# Patient Record
Sex: MALE | Race: WHITE | NOT HISPANIC OR LATINO | URBAN - METROPOLITAN AREA
[De-identification: names, ages, dates, MRNs, and addresses within clinical notes are randomized per-mention and may not be internally consistent; named-entity substitution may affect disease eponyms.]

---
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
## 2024-05-27 ENCOUNTER — HOSPITAL ENCOUNTER (EMERGENCY)
Facility: HOSPITAL | Age: 19
Discharge: HOME | End: 2024-05-27
Attending: FAMILY MEDICINE
Payer: MEDICAID

## 2024-05-27 VITALS
OXYGEN SATURATION: 97 % | WEIGHT: 235 LBS | SYSTOLIC BLOOD PRESSURE: 118 MMHG | DIASTOLIC BLOOD PRESSURE: 68 MMHG | HEART RATE: 95 BPM | BODY MASS INDEX: 37.77 KG/M2 | HEIGHT: 66 IN | TEMPERATURE: 101 F | RESPIRATION RATE: 20 BRPM

## 2024-05-27 DIAGNOSIS — L02.416 ABSCESS OF LEFT LEG: Primary | ICD-10-CM

## 2024-05-27 PROCEDURE — 10060 I&D ABSCESS SIMPLE/SINGLE: CPT

## 2024-05-27 PROCEDURE — 87070 CULTURE OTHR SPECIMN AEROBIC: CPT | Performed by: FAMILY MEDICINE

## 2024-05-27 PROCEDURE — 99284 EMERGENCY DEPT VISIT MOD MDM: CPT | Mod: 25

## 2024-05-27 PROCEDURE — 25000003 PHARM REV CODE 250: Performed by: FAMILY MEDICINE

## 2024-05-27 RX ORDER — LIDOCAINE HYDROCHLORIDE 10 MG/ML
5 INJECTION INFILTRATION; PERINEURAL
Status: COMPLETED | OUTPATIENT
Start: 2024-05-27 | End: 2024-05-27

## 2024-05-27 RX ORDER — CLINDAMYCIN HYDROCHLORIDE 150 MG/1
150 CAPSULE ORAL
Status: COMPLETED | OUTPATIENT
Start: 2024-05-27 | End: 2024-05-27

## 2024-05-27 RX ORDER — IBUPROFEN 600 MG/1
600 TABLET ORAL EVERY 6 HOURS PRN
Qty: 20 TABLET | Refills: 0 | Status: SHIPPED | OUTPATIENT
Start: 2024-05-27 | End: 2024-06-01

## 2024-05-27 RX ORDER — CLINDAMYCIN HYDROCHLORIDE 150 MG/1
150 CAPSULE ORAL 4 TIMES DAILY
Qty: 40 CAPSULE | Refills: 0 | Status: SHIPPED | OUTPATIENT
Start: 2024-05-27 | End: 2024-06-06

## 2024-05-27 RX ADMIN — LIDOCAINE HYDROCHLORIDE 5 ML: 10 INJECTION, SOLUTION INFILTRATION; PERINEURAL at 07:05

## 2024-05-27 RX ADMIN — CLINDAMYCIN HYDROCHLORIDE 150 MG: 150 CAPSULE ORAL at 07:05

## 2024-05-28 NOTE — ED PROVIDER NOTES
Encounter Date: 5/27/2024       History     Chief Complaint   Patient presents with    Insect Bite     PATIENT HAS A RED RAISED AREA POSTERIOR ON HIS LLE.  STATES REDNESS BEGAN 3 DAYS AGO AND CONTINUED TO INCREASE IN SIZE.  TODAY IT BEGAN TO DRAIN     This is a 18-year-old white male who presents complaining of left calf pain for 3 days.  He was not sure whether he was bitten by a spider or an insect but says that he started getting bigger this evening.  Pain was 2/10 in severity and it was described as sharp.  He has a past medical history of asthma.    The history is provided by the patient.     Review of patient's allergies indicates:   Allergen Reactions    Iodine      No past medical history on file.  No past surgical history on file.  No family history on file.  Social History     Tobacco Use    Smoking status: Every Day     Types: Vaping with nicotine    Smokeless tobacco: Never   Substance Use Topics    Alcohol use: Yes    Drug use: Yes     Frequency: 7.0 times per week     Types: Marijuana     Review of Systems   Constitutional:  Negative for fever.   HENT:  Negative for congestion.    Eyes:  Negative for visual disturbance.   Respiratory:  Negative for shortness of breath.    Cardiovascular:  Negative for chest pain.   Gastrointestinal:  Negative for abdominal pain.   Skin:  Positive for color change and wound. Negative for rash.   Neurological:  Negative for dizziness.   Psychiatric/Behavioral:  Negative for behavioral problems.    All other systems reviewed and are negative.      Physical Exam     Initial Vitals [05/27/24 1914]   BP Pulse Resp Temp SpO2   118/68 95 20 98.2 °F (36.8 °C) 97 %      MAP       --         Physical Exam    Nursing note and vitals reviewed.  Constitutional: He appears well-developed and well-nourished. No distress.   HENT:   Head: Normocephalic and atraumatic.   Eyes: Pupils are equal, round, and reactive to light.   Neck: Neck supple.   Normal range of motion.  Cardiovascular:   "Normal rate, regular rhythm and normal heart sounds.           Pulmonary/Chest: Breath sounds normal.   Abdominal: Abdomen is soft. Bowel sounds are normal.   Musculoskeletal:         General: Normal range of motion.      Cervical back: Normal range of motion and neck supple.     Neurological: He is alert and oriented to person, place, and time.   Skin: Skin is warm and dry. There is erythema.   Left calf area with erythema, swelling and tenderness.  It is well circumscribed with central crust         ED Course   I & D - Incision and Drainage    Date/Time: 2024 7:54 PM  Location procedure was performed: [facility] EMERGENCY DEPARTMENT    Performed by: Amilcar Rosario MD  Authorized by: Amilcar Rosario MD  Assisting provider: Amilcar Rosario MD  Pre-operative diagnosis: abscess  Post-operative diagnosis: abscess  Consent Done: Yes  Consent: Verbal consent obtained.  Risks and benefits: risks, benefits and alternatives were discussed  Consent given by: patient  Patient understanding: patient states understanding of the procedure being performed  Patient consent: the patient's understanding of the procedure matches consent given  Procedure consent: procedure consent matches procedure scheduled  Relevant documents: relevant documents present and verified  Patient identity confirmed: , name and verbally with patient  Time out: Immediately prior to procedure a "time out" was called to verify the correct patient, procedure, equipment, support staff and site/side marked as required.  Type: abscess  Body area: lower extremity  Location details: left leg  Anesthesia: local infiltration    Anesthesia:  Local Anesthetic: lidocaine 1% without epinephrine  Anesthetic total: 10 mL    Patient sedated: no  Scalpel size: 11  Incision type: single straight  Incision depth: dermal  Complexity: simple  Drainage: serosanguinous  Drainage amount: scant  Wound treatment: incision, drainage and expression of " material  Complications: No  Estimated blood loss (mL): 3  Specimens: Yes  Implants: No    Incision depth: dermal        Labs Reviewed   CULTURE, AEROBIC  (SPECIFY SOURCE)          Imaging Results    None          Medications   LIDOcaine HCL 10 mg/ml (1%) injection 5 mL (5 mLs Infiltration Given 5/27/24 1934)   clindamycin capsule 150 mg (150 mg Oral Given 5/27/24 1934)     Medical Decision Making  Differential diagnosis:  Cellulitis, abscess, contusion, fungal infection, insect bite, spider bite    Risk  Prescription drug management.                                      Clinical Impression:  Final diagnoses:  [L02.416] Abscess of left leg (Primary)          ED Disposition Condition    Discharge Stable          ED Prescriptions       Medication Sig Dispense Start Date End Date Auth. Provider    clindamycin (CLEOCIN) 150 MG capsule Take 1 capsule (150 mg total) by mouth 4 (four) times daily. for 10 days 40 capsule 5/27/2024 6/6/2024 Amilcar Rosario MD    ibuprofen (ADVIL,MOTRIN) 600 MG tablet Take 1 tablet (600 mg total) by mouth every 6 (six) hours as needed for Pain. 20 tablet 5/27/2024 6/1/2024 Amilcar Rosario MD          Follow-up Information       Follow up With Specialties Details Why Contact Info    Reny Deluca MD Pediatrics In 1 day  [address] [facility] [address]  [phone]               Amilcar Rosario MD  05/27/24 2001

## 2024-05-28 NOTE — ED NOTES
18 YEAR OLD MALE PRESENTED TO ED AMBULATORY WITH A POSSIBLE INSECT BITE ON HIS LLE POSTERIORLY.  STATES REDNESS BEGAN 3 DAYS AGO AND CONTINUED TO INCREASE.  STATES DRAINAGE BEGAN TODAY. NAD

## 2024-05-30 LAB — BACTERIA WND CULT: ABNORMAL

## 2024-05-31 NOTE — ED PROVIDER NOTES
Encounter Date: 5/27/2024       History     Chief Complaint   Patient presents with    Insect Bite     PATIENT HAS A RED RAISED AREA POSTERIOR ON HIS LLE.  STATES REDNESS BEGAN 3 DAYS AGO AND CONTINUED TO INCREASE IN SIZE.  TODAY IT BEGAN TO DRAIN     HPI  Review of patient's allergies indicates:   Allergen Reactions    Iodine      No past medical history on file.  No past surgical history on file.  No family history on file.  Social History     Tobacco Use    Smoking status: Every Day     Types: Vaping with nicotine    Smokeless tobacco: Never   Substance Use Topics    Alcohol use: Yes    Drug use: Yes     Frequency: 7.0 times per week     Types: Marijuana     Review of Systems    Physical Exam     Initial Vitals [05/27/24 1914]   BP Pulse Resp Temp SpO2   118/68 95 20 98.2 °F (36.8 °C) 97 %      MAP       --         Physical Exam    ED Course   Procedures  Labs Reviewed   WOUND CULTURE ([facility]) - Abnormal; Notable for the following components:       Result Value    Wound Culture   (*)     Value: Moderate Methicillin resistant Staphylococcus aureus    All other components within normal limits   CULTURE, AEROBIC  (SPECIFY SOURCE)          Imaging Results    None          Medications   LIDOcaine HCL 10 mg/ml (1%) injection 5 mL (5 mLs Infiltration Given 5/27/24 1934)   clindamycin capsule 150 mg (150 mg Oral Given 5/27/24 1934)     Medical Decision Making  Amount and/or Complexity of Data Reviewed  Labs: ordered.    Risk  Prescription drug management.                                      Clinical Impression:  Final diagnoses:  [L02.416] Abscess of left leg (Primary)          ED Disposition Condition    Discharge Stable          ED Prescriptions       Medication Sig Dispense Start Date End Date Auth. Provider    clindamycin (CLEOCIN) 150 MG capsule Take 1 capsule (150 mg total) by mouth 4 (four) times daily. for 10 days 40 capsule 5/27/2024 6/6/2024 Amilcar Rosario MD    ibuprofen (ADVIL,MOTRIN) 600 MG tablet Take  1 tablet (600 mg total) by mouth every 6 (six) hours as needed for Pain. 20 tablet 5/27/2024 6/1/2024 Amilcar Rosario MD          Follow-up Information       Follow up With Specialties Details Why Contact Info    Reny Deluca MD Pediatrics In 1 day  [address] [facility] [address]  [phone]               Sumit Shine MD  05/31/24 0863

## 2025-03-27 ENCOUNTER — HOSPITAL ENCOUNTER (EMERGENCY)
Facility: HOSPITAL | Age: 20
Discharge: HOME | End: 2025-03-27
Attending: FAMILY MEDICINE
Payer: MEDICAID

## 2025-03-27 VITALS
HEIGHT: 64 IN | TEMPERATURE: 98 F | DIASTOLIC BLOOD PRESSURE: 96 MMHG | WEIGHT: 255 LBS | OXYGEN SATURATION: 98 % | RESPIRATION RATE: 18 BRPM | HEART RATE: 98 BPM | BODY MASS INDEX: 43.54 KG/M2 | SYSTOLIC BLOOD PRESSURE: 148 MMHG

## 2025-03-27 DIAGNOSIS — L23.5 ALLERGIC DERMATITIS DUE TO OTHER CHEMICAL PRODUCT: Primary | ICD-10-CM

## 2025-03-27 PROCEDURE — 99281 EMR DPT VST MAYX REQ PHY/QHP: CPT

## 2025-03-27 RX ORDER — DIVALPROEX SODIUM 250 MG/1
250 TABLET, DELAYED RELEASE ORAL 2 TIMES DAILY
COMMUNITY
Start: 2025-03-10

## 2025-03-27 RX ORDER — CARIPRAZINE 3 MG/1
3 CAPSULE, GELATIN COATED ORAL
COMMUNITY
Start: 2025-03-10

## 2025-03-27 RX ORDER — DIVALPROEX SODIUM 500 MG/1
500 TABLET, DELAYED RELEASE ORAL 2 TIMES DAILY
COMMUNITY
Start: 2025-03-17

## 2025-03-27 RX ORDER — LEVOTHYROXINE SODIUM 25 UG/1
25 TABLET ORAL EVERY MORNING
COMMUNITY
Start: 2025-01-09

## 2025-03-27 RX ORDER — PANTOPRAZOLE SODIUM 40 MG/1
40 TABLET, DELAYED RELEASE ORAL EVERY MORNING
COMMUNITY
Start: 2025-01-14

## 2025-03-27 RX ORDER — BUSPIRONE HYDROCHLORIDE 7.5 MG/1
7.5 TABLET ORAL 2 TIMES DAILY
COMMUNITY
Start: 2025-02-25

## 2025-03-27 NOTE — DISCHARGE INSTRUCTIONS
Use the Rx for flucinolone 0.01% cream twice a day for a week.     If the rash doesn't resolve or gets worse, contact your PCP.

## 2025-03-27 NOTE — ED PROVIDER NOTES
Encounter Date: 3/27/2025       History     Chief Complaint   Patient presents with    Rash     Dry, irritated, rash to face starting today after working around mold yesterday.       After cleaning today, noted rash to face. Not itching, blotchy and red. Pt here with mother. Denies trouble breathing. Mild itching noted.    The history is provided by the patient and a parent.     Review of patient's allergies indicates:   Allergen Reactions    Iodine      Past Medical History:   Diagnosis Date    Anxiety disorder, unspecified     Bipolar disorder, unspecified     Depression     GERD (gastroesophageal reflux disease)     Thyroid disease      History reviewed. No pertinent surgical history.  No family history on file.  Social History[1]  Review of Systems   Constitutional:  Negative for fever.   HENT:  Negative for sore throat.    Respiratory:  Negative for shortness of breath.    Cardiovascular:  Negative for chest pain.   Gastrointestinal:  Negative for nausea.   Genitourinary:  Negative for dysuria.   Musculoskeletal:  Negative for back pain.   Skin:  Negative for rash.   Neurological:  Negative for weakness.   Hematological:  Does not bruise/bleed easily.   All other systems reviewed and are negative.      Physical Exam     Initial Vitals [03/27/25 1817]   BP Pulse Resp Temp SpO2   (!) 148/96 98 18 98.2 °F (36.8 °C) 98 %      MAP       --         Physical Exam    Nursing note and vitals reviewed.  Constitutional: Vital signs are normal. He appears well-developed and well-nourished. He is cooperative.  Non-toxic appearance. He does not appear ill.   HENT:   Head: Normocephalic and atraumatic.   Eyes: Conjunctivae and lids are normal.   Neck: Trachea normal. Neck supple.   Cardiovascular:  Normal rate and regular rhythm.  No extrasystoles are present.          Pulmonary/Chest: Breath sounds normal.   Abdominal: Abdomen is soft. There is no abdominal tenderness.   Musculoskeletal:         General: Normal range of  motion.      Cervical back: Neck supple.     Neurological: He is alert and oriented to person, place, and time. He has normal strength. No cranial nerve deficit or sensory deficit. He displays a negative Romberg sign.   Skin: Skin is warm, dry and intact. Capillary refill takes less than 2 seconds. Rash noted.   Blotchy rash to face, few ulcers.   Psychiatric: He has a normal mood and affect. His speech is normal and behavior is normal. He is not actively hallucinating. He is attentive.         ED Course   Procedures  Labs Reviewed - No data to display       Imaging Results    None          Medications - No data to display  Medical Decision Making                                    Clinical Impression:  Final diagnoses:  [L23.5] Allergic dermatitis due to other chemical product (Primary)          ED Disposition Condition    Discharge Stable          ED Prescriptions    None       Follow-up Information       Follow up With Specialties Details Why Contact Info    Reny Deluca MD Pediatrics Call  As [facility] [address] [facility] [address]  [phone]                 [1]   Social History  Tobacco Use    Smoking status: Every Day     Types: Vaping with nicotine    Smokeless tobacco: Never   Substance Use Topics    Alcohol use: Yes    Drug use: Yes     Frequency: 7.0 times per week     Types: Marijuana        Christopher Kent MD  03/27/25 [id number]